# Patient Record
Sex: FEMALE | Race: BLACK OR AFRICAN AMERICAN | NOT HISPANIC OR LATINO | URBAN - METROPOLITAN AREA
[De-identification: names, ages, dates, MRNs, and addresses within clinical notes are randomized per-mention and may not be internally consistent; named-entity substitution may affect disease eponyms.]

---

## 2019-02-10 ASSESSMENT — MIFFLIN-ST. JEOR: SCORE: 1213.29

## 2019-02-11 ASSESSMENT — MIFFLIN-ST. JEOR: SCORE: 1215.1

## 2019-02-12 ASSESSMENT — MIFFLIN-ST. JEOR: SCORE: 1199.22

## 2019-02-13 ASSESSMENT — MIFFLIN-ST. JEOR: SCORE: 1194.69

## 2019-02-14 ENCOUNTER — SURGERY - HEALTHEAST (OUTPATIENT)
Dept: CARDIOLOGY | Facility: CLINIC | Age: 49
End: 2019-02-14

## 2019-02-14 ASSESSMENT — MIFFLIN-ST. JEOR: SCORE: 1193.33

## 2019-02-15 ENCOUNTER — AMBULATORY - HEALTHEAST (OUTPATIENT)
Dept: CARDIAC REHAB | Facility: CLINIC | Age: 49
End: 2019-02-15

## 2019-02-15 ASSESSMENT — MIFFLIN-ST. JEOR: SCORE: 1219.18

## 2019-02-16 ASSESSMENT — MIFFLIN-ST. JEOR: SCORE: 1208.75

## 2021-06-02 VITALS — HEIGHT: 62 IN | BODY MASS INDEX: 25.58 KG/M2 | WEIGHT: 139 LBS

## 2021-06-16 PROBLEM — I63.9 STROKE, EMBOLIC (H): Status: ACTIVE | Noted: 2019-02-15

## 2021-06-16 PROBLEM — Z91.148 NON COMPLIANCE W MEDICATION REGIMEN: Status: ACTIVE | Noted: 2019-02-11

## 2021-06-16 PROBLEM — Z95.1 HISTORY OF CORONARY ARTERY BYPASS SURGERY: Status: ACTIVE | Noted: 2019-02-11

## 2021-06-16 PROBLEM — I47.29 NSVT (NONSUSTAINED VENTRICULAR TACHYCARDIA) (H): Status: ACTIVE | Noted: 2019-02-11

## 2021-06-16 PROBLEM — R07.9 CHEST PAIN: Status: ACTIVE | Noted: 2019-02-10

## 2021-06-16 PROBLEM — F17.200 TOBACCO USE DISORDER: Status: ACTIVE | Noted: 2019-02-15

## 2021-06-16 PROBLEM — Z95.5 H/O HEART ARTERY STENT: Status: ACTIVE | Noted: 2019-02-11

## 2021-06-16 PROBLEM — I25.10 CORONARY ARTERY DISEASE INVOLVING NATIVE HEART WITHOUT ANGINA PECTORIS: Status: ACTIVE | Noted: 2019-02-06

## 2021-06-16 PROBLEM — I25.5 ISCHEMIC CARDIOMYOPATHY: Status: ACTIVE | Noted: 2019-02-11

## 2021-06-16 PROBLEM — I50.9 ACUTE ON CHRONIC HEART FAILURE (H): Status: ACTIVE | Noted: 2019-02-11

## 2021-06-24 NOTE — PROGRESS NOTES
Order received for Phase II Cardiac Rehab.  Discussed with patient and family at bedside. Preferred location is [facility].  Order faxed.